# Patient Record
Sex: MALE | URBAN - METROPOLITAN AREA
[De-identification: names, ages, dates, MRNs, and addresses within clinical notes are randomized per-mention and may not be internally consistent; named-entity substitution may affect disease eponyms.]

---

## 2017-02-07 ENCOUNTER — IMPORTED ENCOUNTER (OUTPATIENT)
Dept: URBAN - METROPOLITAN AREA CLINIC 43 | Facility: CLINIC | Age: 72
End: 2017-02-07

## 2017-02-07 PROBLEM — H40.053: Noted: 2017-02-07

## 2017-08-17 ENCOUNTER — IMPORTED ENCOUNTER (OUTPATIENT)
Dept: URBAN - METROPOLITAN AREA CLINIC 43 | Facility: CLINIC | Age: 72
End: 2017-08-17

## 2017-08-17 PROBLEM — H25.13: Noted: 2017-08-17

## 2017-08-17 PROBLEM — H40.053: Noted: 2017-08-17

## 2017-08-17 PROBLEM — H16.143: Noted: 2017-08-17

## 2018-02-21 ENCOUNTER — IMPORTED ENCOUNTER (OUTPATIENT)
Dept: URBAN - METROPOLITAN AREA CLINIC 43 | Facility: CLINIC | Age: 73
End: 2018-02-21

## 2018-02-21 PROBLEM — H40.053: Noted: 2018-02-21

## 2018-07-19 ENCOUNTER — IMPORTED ENCOUNTER (OUTPATIENT)
Dept: URBAN - METROPOLITAN AREA CLINIC 43 | Facility: CLINIC | Age: 73
End: 2018-07-19

## 2018-07-19 PROBLEM — H40.053: Noted: 2018-07-19

## 2018-07-19 PROBLEM — H25.13: Noted: 2018-07-19

## 2018-12-17 ENCOUNTER — IMPORTED ENCOUNTER (OUTPATIENT)
Dept: URBAN - METROPOLITAN AREA CLINIC 43 | Facility: CLINIC | Age: 73
End: 2018-12-17

## 2019-02-05 ENCOUNTER — IMPORTED ENCOUNTER (OUTPATIENT)
Dept: URBAN - METROPOLITAN AREA CLINIC 43 | Facility: CLINIC | Age: 74
End: 2019-02-05

## 2019-02-05 PROBLEM — H40.053: Noted: 2019-02-05

## 2019-07-22 ENCOUNTER — PREPPED CHART (OUTPATIENT)
Dept: URBAN - METROPOLITAN AREA CLINIC 32 | Facility: CLINIC | Age: 74
End: 2019-07-22

## 2019-07-22 ENCOUNTER — IMPORTED ENCOUNTER (OUTPATIENT)
Dept: URBAN - METROPOLITAN AREA CLINIC 43 | Facility: CLINIC | Age: 74
End: 2019-07-22

## 2019-07-22 PROBLEM — H25.13: Noted: 2019-07-22

## 2019-07-22 PROBLEM — H40.053: Noted: 2019-07-22

## 2019-07-22 PROBLEM — H43.811: Noted: 2019-07-22

## 2019-07-22 PROBLEM — H16.223: Noted: 2019-07-22

## 2020-02-06 ASSESSMENT — VISUAL ACUITY
OS_CC: 20/30
OD_CC: 20/25
OD_SC: J1
OS_SC: J1

## 2020-02-06 ASSESSMENT — TONOMETRY
OD_IOP_MMHG: 25
OS_IOP_MMHG: 24

## 2020-02-06 ASSESSMENT — PACHYMETRY
OS_CT_UM: 580
OD_CT_UM: 580

## 2020-02-10 ENCOUNTER — FOLLOW UP (OUTPATIENT)
Dept: URBAN - METROPOLITAN AREA CLINIC 32 | Facility: CLINIC | Age: 75
End: 2020-02-10

## 2020-02-10 DIAGNOSIS — H40.053: ICD-10-CM

## 2020-02-10 PROCEDURE — 92133 CPTRZD OPH DX IMG PST SGM ON: CPT

## 2020-02-10 PROCEDURE — 99213 OFFICE O/P EST LOW 20 MIN: CPT

## 2020-02-10 PROCEDURE — 92020 GONIOSCOPY: CPT

## 2020-02-10 ASSESSMENT — VISUAL ACUITY
OS_SC: 20/80+1
OD_PH: 20/25
OD_SC: 20/50-2
OS_PH: 20/30-2

## 2020-02-10 ASSESSMENT — TONOMETRY
OS_IOP_MMHG: 31
OS_IOP_MMHG: 26
OD_IOP_MMHG: 26
OD_IOP_MMHG: 30

## 2020-04-19 ASSESSMENT — TONOMETRY
OD_IOP_MMHG: 25.0
OD_IOP_MMHG: 24.0
OD_IOP_MMHG: 24.0
OS_IOP_MMHG: 23.0
OS_IOP_MMHG: 22.0
OS_IOP_MMHG: 24.0
OS_IOP_MMHG: 25.0
OD_IOP_MMHG: 25.0
OS_IOP_MMHG: 27.0
OD_IOP_MMHG: 33.0
OS_IOP_MMHG: 32.0
OS_IOP_MMHG: 25.0
OD_IOP_MMHG: 23.0
OS_IOP_MMHG: 24.0
OD_IOP_MMHG: 25.0
OS_IOP_MMHG: 21.0
OD_IOP_MMHG: 22.0
OD_IOP_MMHG: 26.0

## 2020-04-19 ASSESSMENT — KERATOMETRY
OS_K1POWER_DIOPTERS: 44
OD_K2POWER_DIOPTERS: 43.75
OS_AXISANGLE_DEGREES: 176
OS_K2POWER_DIOPTERS: 42.5
OD_K1POWER_DIOPTERS: 42.75
OD_AXISANGLE2_DEGREES: 10
OS_AXISANGLE_DEGREES: 80
OS_K1POWER_DIOPTERS: 42.75
OS_K2POWER_DIOPTERS: 44.25
OD_K1POWER_DIOPTERS: 43.75
OS_AXISANGLE2_DEGREES: 170
OS_AXISANGLE2_DEGREES: 86
OD_AXISANGLE_DEGREES: 178
OD_AXISANGLE_DEGREES: 100
OD_K2POWER_DIOPTERS: 42.75
OD_AXISANGLE2_DEGREES: 88

## 2020-04-19 ASSESSMENT — VISUAL ACUITY
OD_OTHER: 20/40.
OS_CC: 20/40
OD_CC: 20/25
OS_CC: J1+
OS_OTHER: 20/40.
OS_CC: 20/40+1
OD_CC: 20/25-1
OD_CC: 20/25-3
OD_SC: J3
OD_OTHER: 20/40.
OD_CC: J1+
OS_SC: 20/70
OS_SC: J1
OS_SC: J3
OS_CC: 20/30-1
OD_CC: 20/30
OD_OTHER: 20/200.
OS_OTHER: 20/200.
OS_PH: 20/30-
OD_CC: 20/20-2
OS_OTHER: 20/50.
OS_CC: 20/30
OS_CC: 20/40-2
OS_PH: 20/30-1
OD_SC: 20/60-2
OD_SC: J1

## 2021-01-15 ENCOUNTER — ESTABLISHED COMPREHENSIVE EXAM (OUTPATIENT)
Dept: URBAN - METROPOLITAN AREA CLINIC 32 | Facility: CLINIC | Age: 76
End: 2021-01-15

## 2021-01-15 DIAGNOSIS — H25.13: ICD-10-CM

## 2021-01-15 DIAGNOSIS — H40.053: ICD-10-CM

## 2021-01-15 PROCEDURE — 92015 DETERMINE REFRACTIVE STATE: CPT

## 2021-01-15 PROCEDURE — 92133 CPTRZD OPH DX IMG PST SGM ON: CPT

## 2021-01-15 PROCEDURE — 92014 COMPRE OPH EXAM EST PT 1/>: CPT

## 2021-01-15 ASSESSMENT — KERATOMETRY
OD_K1POWER_DIOPTERS: 43.75
OS_K2POWER_DIOPTERS: 42.75
OD_K2POWER_DIOPTERS: 42.75
OS_K1POWER_DIOPTERS: 44.25
OS_AXISANGLE_DEGREES: 178
OD_AXISANGLE_DEGREES: 5
OS_AXISANGLE2_DEGREES: 88
OD_AXISANGLE2_DEGREES: 95

## 2021-01-15 ASSESSMENT — VISUAL ACUITY
OS_CC: 20/30
OS_SC: 20/80
OS_GLARE: 20/50
OD_CC: 20/40
OD_CC: J1
OS_CC: J1
OD_SC: 20/50
OD_GLARE: 20/50

## 2021-01-15 ASSESSMENT — TONOMETRY
OD_IOP_MMHG: 23
OS_IOP_MMHG: 24

## 2021-08-03 NOTE — PATIENT DISCUSSION
[facility] Visual [facility] 36 point screen: I have reviewed the visual fields both taped and untaped on this patient which demonstrate significant obstruction of the patient's peripheral visual field on both eyes.

## 2021-12-16 NOTE — PATIENT DISCUSSION
Patient is here for suture removal.  The patient has a normal amount of bruising and swelling consistent with the post operative course.  The suture lines are intact, there is no evidence of infection.  The plan is to remove the sutures today with an expectation of normal healing.  The post op course has been further reviewed with the patient. Sutures removed today without difficulty. Resume normal activity.  Resume any medications that were discontinued for surgery. Stop cold compresses Hot compresses to affected lid(s) 2-3 times daily for one month, 5 minutes at a time.  Artificial tears prn burning/itching/blurry vision. Wash incisions/ lash line once daily with baby shampoo.

## 2022-01-20 ENCOUNTER — ESTABLISHED PATIENT (OUTPATIENT)
Dept: URBAN - METROPOLITAN AREA CLINIC 32 | Facility: CLINIC | Age: 77
End: 2022-01-20

## 2022-01-20 DIAGNOSIS — H40.053: ICD-10-CM

## 2022-01-20 DIAGNOSIS — H25.13: ICD-10-CM

## 2022-01-20 PROCEDURE — 92015 DETERMINE REFRACTIVE STATE: CPT

## 2022-01-20 PROCEDURE — 92133 CPTRZD OPH DX IMG PST SGM ON: CPT

## 2022-01-20 PROCEDURE — 92014 COMPRE OPH EXAM EST PT 1/>: CPT

## 2022-01-20 ASSESSMENT — VISUAL ACUITY
OD_SC: 20/50
OS_CC: 20/30-2
OU_CC: J1
OS_SC: 20/80
OD_CC: 20/25-2

## 2022-01-20 ASSESSMENT — KERATOMETRY
OS_AXISANGLE2_DEGREES: 88
OS_K2POWER_DIOPTERS: 42.75
OS_AXISANGLE_DEGREES: 178
OD_K1POWER_DIOPTERS: 43.75
OD_K2POWER_DIOPTERS: 42.75
OD_AXISANGLE2_DEGREES: 95
OD_AXISANGLE_DEGREES: 5
OS_K1POWER_DIOPTERS: 44.25

## 2022-01-20 ASSESSMENT — TONOMETRY
OD_IOP_MMHG: 22
OS_IOP_MMHG: 22

## 2022-07-25 ASSESSMENT — KERATOMETRY
OD_K1POWER_DIOPTERS: 43.75
OS_AXISANGLE2_DEGREES: 88
OD_AXISANGLE_DEGREES: 5
OS_K2POWER_DIOPTERS: 42.75
OD_AXISANGLE2_DEGREES: 95
OS_K1POWER_DIOPTERS: 44.25
OS_AXISANGLE_DEGREES: 178
OD_K2POWER_DIOPTERS: 42.75

## 2022-07-26 ENCOUNTER — ESTABLISHED PATIENT (OUTPATIENT)
Dept: URBAN - METROPOLITAN AREA CLINIC 32 | Facility: CLINIC | Age: 77
End: 2022-07-26

## 2022-07-26 DIAGNOSIS — H01.013: ICD-10-CM

## 2022-07-26 DIAGNOSIS — H01.016: ICD-10-CM

## 2022-07-26 PROCEDURE — 92012 INTRM OPH EXAM EST PATIENT: CPT

## 2022-07-26 RX ORDER — ERYTHROMYCIN 5 MG/G
OINTMENT OPHTHALMIC TWICE A DAY
Start: 2022-07-26 | End: 2022-08-02

## 2022-07-26 ASSESSMENT — VISUAL ACUITY
OS_CC: 20/30
OD_CC: 20/25-2

## 2022-07-26 ASSESSMENT — TONOMETRY
OS_IOP_MMHG: 24
OD_IOP_MMHG: 21

## 2022-08-17 ENCOUNTER — EMERGENCY VISIT (OUTPATIENT)
Dept: URBAN - METROPOLITAN AREA CLINIC 32 | Facility: CLINIC | Age: 77
End: 2022-08-17

## 2022-08-17 DIAGNOSIS — H01.013: ICD-10-CM

## 2022-08-17 DIAGNOSIS — H01.016: ICD-10-CM

## 2022-08-17 DIAGNOSIS — H40.053: ICD-10-CM

## 2022-08-17 PROCEDURE — 92012 INTRM OPH EXAM EST PATIENT: CPT

## 2022-08-17 ASSESSMENT — TONOMETRY
OD_IOP_MMHG: 20
OS_IOP_MMHG: 21

## 2022-08-17 ASSESSMENT — KERATOMETRY
OS_AXISANGLE_DEGREES: 178
OS_AXISANGLE2_DEGREES: 88
OD_K1POWER_DIOPTERS: 43.75
OD_AXISANGLE_DEGREES: 5
OS_K1POWER_DIOPTERS: 44.25
OD_K2POWER_DIOPTERS: 42.75
OS_K2POWER_DIOPTERS: 42.75
OD_AXISANGLE2_DEGREES: 95

## 2022-08-17 ASSESSMENT — VISUAL ACUITY
OU_CC: 20/20
OS_CC: 20/25
OD_CC: 20/25

## 2023-04-20 ENCOUNTER — EMERGENCY VISIT (OUTPATIENT)
Dept: URBAN - METROPOLITAN AREA CLINIC 32 | Facility: CLINIC | Age: 78
End: 2023-04-20

## 2023-04-20 DIAGNOSIS — H10.023: ICD-10-CM

## 2023-04-20 PROCEDURE — 92012 INTRM OPH EXAM EST PATIENT: CPT

## 2023-04-20 RX ORDER — TOBRAMYCIN 3 MG/ML
1 SOLUTION/ DROPS OPHTHALMIC
Start: 2023-04-20 | End: 2023-04-27

## 2023-04-20 ASSESSMENT — KERATOMETRY
OS_AXISANGLE_DEGREES: 001
OD_AXISANGLE2_DEGREES: 93
OS_AXISANGLE2_DEGREES: 91
OD_K1POWER_DIOPTERS: 44.00
OS_K1POWER_DIOPTERS: 44.25
OS_K2POWER_DIOPTERS: 42.50
OD_AXISANGLE_DEGREES: 003
OD_K2POWER_DIOPTERS: 42.75

## 2023-04-20 ASSESSMENT — VISUAL ACUITY
OS_PH: 20/30
OS_CC: 20/40
OD_CC: 20/25+2

## 2023-04-20 ASSESSMENT — TONOMETRY
OS_IOP_MMHG: 15
OD_IOP_MMHG: 13

## 2023-08-03 ENCOUNTER — FOLLOW UP (OUTPATIENT)
Dept: URBAN - METROPOLITAN AREA CLINIC 32 | Facility: CLINIC | Age: 78
End: 2023-08-03

## 2023-08-03 DIAGNOSIS — H40.053: ICD-10-CM

## 2023-08-03 PROCEDURE — 92083 EXTENDED VISUAL FIELD XM: CPT

## 2023-08-03 PROCEDURE — 99213 OFFICE O/P EST LOW 20 MIN: CPT

## 2023-08-03 ASSESSMENT — KERATOMETRY
OD_AXISANGLE2_DEGREES: 93
OS_AXISANGLE2_DEGREES: 91
OS_K1POWER_DIOPTERS: 44.25
OD_AXISANGLE_DEGREES: 003
OD_K2POWER_DIOPTERS: 42.75
OS_AXISANGLE_DEGREES: 001
OS_K2POWER_DIOPTERS: 42.50
OD_K1POWER_DIOPTERS: 44.00

## 2023-08-03 ASSESSMENT — VISUAL ACUITY
OD_CC: 20/20-2
OS_PH: 20/30-2
OS_CC: 20/40

## 2023-08-03 ASSESSMENT — TONOMETRY
OD_IOP_MMHG: 18
OS_IOP_MMHG: 16
OD_IOP_MMHG: 14
OS_IOP_MMHG: 19

## 2024-02-20 ENCOUNTER — COMPREHENSIVE EXAM (OUTPATIENT)
Dept: URBAN - METROPOLITAN AREA CLINIC 32 | Facility: CLINIC | Age: 79
End: 2024-02-20

## 2024-02-20 DIAGNOSIS — H43.811: ICD-10-CM

## 2024-02-20 DIAGNOSIS — H40.053: ICD-10-CM

## 2024-02-20 DIAGNOSIS — H25.13: ICD-10-CM

## 2024-02-20 DIAGNOSIS — H01.013: ICD-10-CM

## 2024-02-20 DIAGNOSIS — H02.831: ICD-10-CM

## 2024-02-20 DIAGNOSIS — H01.016: ICD-10-CM

## 2024-02-20 DIAGNOSIS — H02.834: ICD-10-CM

## 2024-02-20 DIAGNOSIS — H16.223: ICD-10-CM

## 2024-02-20 PROCEDURE — 92015 DETERMINE REFRACTIVE STATE: CPT

## 2024-02-20 PROCEDURE — 92133 CPTRZD OPH DX IMG PST SGM ON: CPT

## 2024-02-20 PROCEDURE — 99214 OFFICE O/P EST MOD 30 MIN: CPT

## 2024-02-20 ASSESSMENT — KERATOMETRY
OD_AXISANGLE2_DEGREES: 93
OS_AXISANGLE2_DEGREES: 95
OS_K2POWER_DIOPTERS: 42.75
OD_K2POWER_DIOPTERS: 43.00
OD_K1POWER_DIOPTERS: 44.25
OD_AXISANGLE_DEGREES: 003
OS_K1POWER_DIOPTERS: 44.75
OS_AXISANGLE_DEGREES: 005

## 2024-02-20 ASSESSMENT — VISUAL ACUITY
OD_SC: 20/40-1
OS_PH: 20/40+2
OS_CC: J1+
OD_CC: 20/30
OD_CC: J1+
OS_SC: J1-2
OD_SC: J1-1
OS_SC: 20/70

## 2024-02-20 ASSESSMENT — TONOMETRY
OS_IOP_MMHG: 17
OD_IOP_MMHG: 17

## 2024-08-14 ASSESSMENT — KERATOMETRY
OS_AXISANGLE2_DEGREES: 95
OS_AXISANGLE_DEGREES: 005
OS_K1POWER_DIOPTERS: 44.75
OS_K2POWER_DIOPTERS: 42.75
OD_K2POWER_DIOPTERS: 43.00
OD_K1POWER_DIOPTERS: 44.25
OD_AXISANGLE2_DEGREES: 93
OD_AXISANGLE_DEGREES: 003

## 2024-08-15 ENCOUNTER — FOLLOW UP (OUTPATIENT)
Dept: URBAN - METROPOLITAN AREA CLINIC 32 | Facility: CLINIC | Age: 79
End: 2024-08-15

## 2024-08-15 DIAGNOSIS — H40.053: ICD-10-CM

## 2024-08-15 PROCEDURE — 99213 OFFICE O/P EST LOW 20 MIN: CPT

## 2024-08-15 PROCEDURE — 92083 EXTENDED VISUAL FIELD XM: CPT

## 2024-08-15 ASSESSMENT — TONOMETRY
OS_IOP_MMHG: 16
OD_IOP_MMHG: 17

## 2024-08-15 ASSESSMENT — VISUAL ACUITY
OS_CC: 20/70+1
OD_CC: 20/25-1

## 2025-02-25 ENCOUNTER — COMPREHENSIVE EXAM (OUTPATIENT)
Age: 80
End: 2025-02-25

## 2025-08-26 ENCOUNTER — FOLLOW UP (OUTPATIENT)
Age: 80
End: 2025-08-26

## 2025-08-26 DIAGNOSIS — H40.053: ICD-10-CM

## 2025-08-26 PROCEDURE — 99213 OFFICE O/P EST LOW 20 MIN: CPT

## 2025-08-26 PROCEDURE — 92083 EXTENDED VISUAL FIELD XM: CPT
